# Patient Record
Sex: MALE | Race: WHITE | Employment: UNEMPLOYED | RURAL
[De-identification: names, ages, dates, MRNs, and addresses within clinical notes are randomized per-mention and may not be internally consistent; named-entity substitution may affect disease eponyms.]

---

## 2019-04-14 ENCOUNTER — HOSPITAL ENCOUNTER (EMERGENCY)
Facility: HOSPITAL | Age: 29
Discharge: HOME | End: 2019-04-14
Attending: HOSPITALIST
Payer: COMMERCIAL

## 2019-04-14 VITALS
WEIGHT: 265 LBS | RESPIRATION RATE: 16 BRPM | HEART RATE: 74 BPM | DIASTOLIC BLOOD PRESSURE: 89 MMHG | SYSTOLIC BLOOD PRESSURE: 133 MMHG | HEIGHT: 75 IN | OXYGEN SATURATION: 98 % | TEMPERATURE: 98.3 F | BODY MASS INDEX: 32.95 KG/M2

## 2019-04-14 DIAGNOSIS — K12.2 UVULITIS: Primary | ICD-10-CM

## 2019-04-14 LAB — S PYO AG THROAT QL: NEGATIVE

## 2019-04-14 PROCEDURE — 96372 THER/PROPH/DIAG INJ SC/IM: CPT

## 2019-04-14 PROCEDURE — 87880 STREP A ASSAY W/OPTIC: CPT

## 2019-04-14 PROCEDURE — 6360000002 HC RX W HCPCS: Performed by: HOSPITALIST

## 2019-04-14 PROCEDURE — 99283 EMERGENCY DEPT VISIT LOW MDM: CPT

## 2019-04-14 RX ORDER — DEXAMETHASONE SODIUM PHOSPHATE 10 MG/ML
10 INJECTION INTRAMUSCULAR; INTRAVENOUS ONCE
Status: COMPLETED | OUTPATIENT
Start: 2019-04-14 | End: 2019-04-14

## 2019-04-14 RX ORDER — CEFDINIR 300 MG/1
300 CAPSULE ORAL 2 TIMES DAILY
Qty: 20 CAPSULE | Refills: 0 | Status: SHIPPED | OUTPATIENT
Start: 2019-04-14 | End: 2019-04-24

## 2019-04-14 RX ADMIN — DEXAMETHASONE SODIUM PHOSPHATE 10 MG: 10 INJECTION INTRAMUSCULAR; INTRAVENOUS at 08:06

## 2019-04-14 ASSESSMENT — PAIN DESCRIPTION - PAIN TYPE: TYPE: ACUTE PAIN

## 2019-04-14 ASSESSMENT — PAIN DESCRIPTION - PROGRESSION: CLINICAL_PROGRESSION: GRADUALLY WORSENING

## 2019-04-14 ASSESSMENT — PAIN DESCRIPTION - DESCRIPTORS: DESCRIPTORS: SHARP

## 2019-04-14 ASSESSMENT — PAIN DESCRIPTION - LOCATION: LOCATION: THROAT

## 2019-04-14 ASSESSMENT — PAIN SCALES - GENERAL: PAINLEVEL_OUTOF10: 6

## 2019-04-14 ASSESSMENT — PAIN DESCRIPTION - ONSET: ONSET: SUDDEN

## 2019-04-14 NOTE — ED PROVIDER NOTES
reviewed. No pertinent past medical history. SURGICAL HISTORY     History reviewed. No pertinent surgical history. CURRENT MEDICATIONS       Previous Medications    No medications on file       ALLERGIES     Patient has no known allergies. FAMILY HISTORY     History reviewed. No pertinent family history.        SOCIAL HISTORY       Social History     Socioeconomic History    Marital status: Single     Spouse name: None    Number of children: None    Years of education: None    Highest education level: None   Occupational History    None   Social Needs    Financial resource strain: None    Food insecurity:     Worry: None     Inability: None    Transportation needs:     Medical: None     Non-medical: None   Tobacco Use    Smoking status: Current Every Day Smoker     Packs/day: 1.00     Years: 5.00     Pack years: 5.00     Types: Cigarettes    Smokeless tobacco: Never Used   Substance and Sexual Activity    Alcohol use: Not Currently    Drug use: None    Sexual activity: None   Lifestyle    Physical activity:     Days per week: None     Minutes per session: None    Stress: None   Relationships    Social connections:     Talks on phone: None     Gets together: None     Attends [employer] service: None     Active member of club or organization: None     Attends meetings of clubs or organizations: None     Relationship status: None    Intimate partner violence:     Fear of current or ex partner: None     Emotionally abused: None     Physically abused: None     Forced sexual activity: None   Other Topics Concern    None   Social History Narrative    None         PHYSICAL EXAM    (up to 7 for level 4, 8 or more for level 5)     ED Triage Vitals   BP Temp Temp src Pulse Resp SpO2 Height Weight   -- -- -- -- -- -- -- --       Physical Exam  General :Patient is awake, alert, oriented, in no acute distress, nontoxic appearing  HEENT: Pupils are equally round and reactive to light, EOMI, Pulse: 77   Resp: 16   Temp: 98.3 °F (36.8 °C)   TempSrc: Oral   SpO2: 98%   Weight: 265 lb (120.2 kg)   Height: 6' 3\" (1.905 m)       MEDICATIONS ADMINISTERED IN ED:  Medications   dexamethasone (DECADRON) injection 10 mg (10 mg Intramuscular Given 19 0806)       After initial evaluation and examination I did have a conversation with the patient about upcoming plan, treatment and possible disposition to the time of this dictation. Patient advised that does not look like he actually has \"tonsillitis\" upon exam wise he looks more like uvulitis. Patient's uvula is midline but it is edematous and swollen. Patient advised since he does have positive sick contacts at home with strep throat this is most likely secondary to infection or allergic reaction. Patient will have a strep screen performed here even if the strep screen is negative I would still cover him with a broad-spectrum antibiotic. Mononucleosis can also cause similar symptoms however he does not really have the triad or other symptoms which would make me concern for mono at this time. Patient will be given a injection of Decadron 10 mg IM for his swelling to see if this will help with his symptoms. Advised he may need to switch his diet over to a liquid or very soft diet for the next couple of days until the swelling goes down so that he may better tolerate ingestion and swallowing of foods. Patient does state his understanding. Patient's final disposition be determined once his diagnostic study is done and his a shot time is . Patient is in no acute distress respiratory-wise breath sounds are clear there is no stridor of the upper airway noted. Strep screen was negative    Patient's diagnostic studies were discussed with him he does state his understanding. Patient does have a primary care provider that he follows with and advised that he does need to follow-up with him within the next 1-2 days for reevaluation.  Advised I do not believe he needs a prescription for steroids at home since we gave him a dose of Decadron which is a more longer acting steroid and this should help with his symptoms over the next several days. Also advised he can use warm salt water gargles to see if that will help his symptoms also. Patient was given instructions of his symptoms worsens or new symptoms arise he should return back to emergency department for further evaluation workup. Patient was placed on Omnicef for broad spectrum coverage even though his strep screen was negative. The patient will follow-up with their PCP in 1-2 days for reevaluation. If the patient or family members have anyfurther concerns or any worsening symptoms they will return to the ED for reevaluation. CONSULTS:  None    PROCEDURES:  Procedures    CRITICAL CARE TIME    Total Critical Care time was 0 minutes, excluding separately reportable procedures. There was a high probability of clinically significant/life threatening deterioration in the patient's condition which required my urgent intervention. FINAL IMPRESSION      1. Uvulitis          DISPOSITION/PLAN   DISPOSITION        PATIENT REFERRED TO:  Your PCP  1-2 days        [facility] Emergency Department  [facility] [address] [facility]  [phone]    As needed, If symptoms worsen      DISCHARGE MEDICATIONS:  New Prescriptions    CEFDINIR (OMNICEF) 300 MG CAPSULE    Take 1 capsule by mouth 2 times daily for 10 days       Comment: Please note this report has been produced using speech recognition software and may contain errorsrelated to that system including errors in grammar, punctuation, and spelling, as well as words and phrases that may be inappropriate. If there are any questions or concerns please feel free to contact the dictating providerfor clarification.     Charles Welch DO  Attending Emergency Physician              Charles Welch DO  04/14/19 [id number]

## 2019-04-14 NOTE — FLOWSHEET NOTE
Discharge instructions reviewed with pt, understanding verbalized, no further needs or concerns at this time. Pt ambulated out of ED without difficulty.

## 2025-06-24 NOTE — ED TRIAGE NOTES
Pt to ED with throat pain and swelling, states he woke up with symptoms this morning. Pt also reports difficulty swallowing. 134